# Patient Record
Sex: FEMALE | Race: WHITE
[De-identification: names, ages, dates, MRNs, and addresses within clinical notes are randomized per-mention and may not be internally consistent; named-entity substitution may affect disease eponyms.]

---

## 2020-11-16 ENCOUNTER — HOSPITAL ENCOUNTER (OUTPATIENT)
Dept: HOSPITAL 75 - RAD FS | Age: 65
End: 2020-11-16
Attending: NURSE PRACTITIONER
Payer: COMMERCIAL

## 2020-11-16 DIAGNOSIS — M17.0: Primary | ICD-10-CM

## 2020-11-16 PROCEDURE — 73562 X-RAY EXAM OF KNEE 3: CPT

## 2020-11-16 NOTE — DIAGNOSTIC IMAGING REPORT
INDICATION: Osteoarthritis, pain



COMPARISON: None available



TECHNIQUE: 3 radiographs of the left knee are obtained dated

11/16/2020.



FINDINGS: No acute fracture or dislocation. No destructive

osseous process. Severe medial joint space narrowing with

sclerosis of the articular surfaces. Minimal lateral joint space

narrowing. Advanced tricompartmental osteophytosis. Mild varus

configuration on standing image. No significant knee joint

effusion.



IMPRESSION: No acute osseous abnormality with advanced

degenerative changes, particularly within the medial compartment.



Dictated by: 



  Dictated on workstation # [id number]

## 2020-11-16 NOTE — DIAGNOSTIC IMAGING REPORT
INDICATION: Chronic knee pain.



COMPARISON: None



FINDINGS: Three radiographic views of the right knee were

obtained. There is no acute fracture or dislocation. Osseous

structures are intact. There is severe joint space narrowing of

the medial compartment as well as sclerotic remodeling of the

articular surfaces. Osteophyte formations are also noted.

Additionally, there is mild lateral subluxation of the tibia in

respect to the femur. Similar changes are also noted involving

the patellar trochlear and lateral tibiofemoral joint spaces,

although to a lesser degree. No large joint effusion is seen. No

unexpected radiopaque foreign bodies are identified.



IMPRESSION:

1. No acute fracture or dislocation of the right knee.

2. Advanced osteoarthritic changes, greatest involving the medial

tibial femoral compartment.



Dictated by: 



  Dictated on workstation # [id number]

## 2023-07-19 ENCOUNTER — HOSPITAL ENCOUNTER (OUTPATIENT)
Dept: HOSPITAL 75 - RAD | Age: 68
End: 2023-07-19
Attending: FAMILY MEDICINE
Payer: COMMERCIAL

## 2023-07-19 DIAGNOSIS — R19.7: ICD-10-CM

## 2023-07-19 DIAGNOSIS — R10.84: Primary | ICD-10-CM

## 2023-07-19 PROCEDURE — 74181 MRI ABDOMEN W/O CONTRAST: CPT

## 2023-07-19 NOTE — DIAGNOSTIC IMAGING REPORT
EXAMINATION: MRI of the abdomen without contrast. MRCP



TECHNIQUE: Multiplanar, multisequence MR images of the abdomen

were obtained without intravenous contrast including 3D MIP MRCP

images.



HISTORY: Abdominal pain and diarrhea.



COMPARISON: None available.



FINDINGS: 



Liver: No suspicious liver lesions. No steatosis. No surface

nodularity.



Ducts: No biliary ductal dilation. No stones or strictures.



Gallbladder: Normal.



Pancreas: Normal.



Spleen: Normal. 



Adrenals: Normal.



Kidneys: No suspicious lesions. No hydronephrosis.



Bowel: Normal.



Other: No lymphadenopathy. Visualized portions of the thorax are

normal. No suspicious osseous lesions. 



IMPRESSION:



1. No acute abnormality in the abdomen.



Dictated by: 



  Dictated on workstation # [id number]

## 2023-08-03 ENCOUNTER — HOSPITAL ENCOUNTER (OUTPATIENT)
Dept: HOSPITAL 75 - CARD | Age: 68
End: 2023-08-03
Attending: FAMILY MEDICINE
Payer: COMMERCIAL

## 2023-08-03 DIAGNOSIS — R10.84: ICD-10-CM

## 2023-08-03 DIAGNOSIS — R19.7: Primary | ICD-10-CM

## 2023-08-03 PROCEDURE — 78227 HEPATOBIL SYST IMAGE W/DRUG: CPT

## 2023-08-03 NOTE — DIAGNOSTIC IMAGING REPORT
RADIOPHARMACEUTICAL: 

5.33 mCi Tc-99m Choletec IV



INDICATION: Diarrhea, R19.7.



TECHNIQUE: Anterior dynamic imaging for 1 hour. Additional 60

minutes of imaging was performed after the patient ingested an 8

ounce can of Ensure.



FINDINGS:

There is homogenous uptake throughout the liver.  The gallbladder

is visualized at 25 minutes and small bowel at 80 minutes.



After ingesting an 8 ounce can of Ensure Plus, the gallbladder

ejection fraction is calculated to be 33%.



IMPRESSION: 

1. No evidence of acute cholecystitis or common duct obstruction.

2. The gallbladder ejection fraction is calculated to be 33%,

which is borderline abnormally low. The lower limits of normal

after ingesting a can of Ensure is between 30% and 35%.



Dictated by: 



  Dictated on workstation # [id number]